# Patient Record
Sex: MALE | Race: BLACK OR AFRICAN AMERICAN | NOT HISPANIC OR LATINO | URBAN - METROPOLITAN AREA
[De-identification: names, ages, dates, MRNs, and addresses within clinical notes are randomized per-mention and may not be internally consistent; named-entity substitution may affect disease eponyms.]

---

## 2024-07-13 ENCOUNTER — HOSPITAL ENCOUNTER (EMERGENCY)
Facility: HOSPITAL | Age: 24
Discharge: HOME | End: 2024-07-13
Attending: STUDENT IN AN ORGANIZED HEALTH CARE EDUCATION/TRAINING PROGRAM

## 2024-07-13 VITALS
RESPIRATION RATE: 16 BRPM | DIASTOLIC BLOOD PRESSURE: 70 MMHG | TEMPERATURE: 99 F | HEIGHT: 69 IN | SYSTOLIC BLOOD PRESSURE: 133 MMHG | HEART RATE: 85 BPM | WEIGHT: 200 LBS | OXYGEN SATURATION: 98 % | BODY MASS INDEX: 29.62 KG/M2

## 2024-07-13 DIAGNOSIS — U07.1 COVID-19: Primary | ICD-10-CM

## 2024-07-13 LAB — SARS-COV-2 RDRP RESP QL NAA+PROBE: POSITIVE

## 2024-07-13 PROCEDURE — 87491 CHLMYD TRACH DNA AMP PROBE: CPT | Performed by: STUDENT IN AN ORGANIZED HEALTH CARE EDUCATION/TRAINING PROGRAM

## 2024-07-13 PROCEDURE — 87591 N.GONORRHOEAE DNA AMP PROB: CPT | Performed by: STUDENT IN AN ORGANIZED HEALTH CARE EDUCATION/TRAINING PROGRAM

## 2024-07-13 PROCEDURE — 99283 EMERGENCY DEPT VISIT LOW MDM: CPT

## 2024-07-13 PROCEDURE — U0002 COVID-19 LAB TEST NON-CDC: HCPCS | Performed by: PHYSICIAN ASSISTANT

## 2024-07-13 PROCEDURE — 25000003 PHARM REV CODE 250: Performed by: STUDENT IN AN ORGANIZED HEALTH CARE EDUCATION/TRAINING PROGRAM

## 2024-07-13 RX ORDER — IBUPROFEN 600 MG/1
600 TABLET ORAL
Status: COMPLETED | OUTPATIENT
Start: 2024-07-13 | End: 2024-07-13

## 2024-07-13 RX ADMIN — IBUPROFEN 600 MG: 600 TABLET, FILM COATED ORAL at 09:07

## 2024-07-13 NOTE — Clinical Note
"Benito"Ngiel Posey was seen and treated in our emergency department on 7/13/2024.  He may return to work on 07/17/2024.       If you have any questions or concerns, please don't hesitate to call.      Aris Calles MD"

## 2024-07-14 NOTE — ED NOTES
Urine sent off  Educated pt on pending results and recommendation to abstain from sexual activity until her receives a phone call or results over my chart.  Explained to pt on how to set up a mychart.  Explained to pt his choices of follow up is his results should be positive.  Educated pt on treatment of positive result, abstinence for minimal of 2 weeks after treatment, and notification of partner to receive treatment.    Pt verbalized understanding  All discharge instructions provided.  Pt discharged in stable condition.

## 2024-07-14 NOTE — ED PROVIDER NOTES
"ED Provider Note - 7/13/2024    History     Chief Complaint   Patient presents with    COVID-19 Concerns     Pt c/o congestion, chills, and cough. Denies any chest pain or SOB. Pt does not appear to be in any distress in triage.       HPI     Benito Posey is a 23 y.o. year old male with past medical and surgical history as seen below, presenting with chief complaint of congestion, chills, and cough.  No chest pain or shortness of breath.  Symptoms worsening over the past day.        No past medical history on file.  No past surgical history on file.      No family history on file.     Social Determinants of Health with Concerns     Tobacco Use: Not on file   Alcohol Use: Not on file   Financial Resource Strain: Not on file   Food Insecurity: Not on file   Transportation Needs: Not on file   Physical Activity: Not on file   Stress: Not on file   Housing Stability: Not on file   Depression: Not on file   Utilities: Not on file   Health Literacy: Not on file   Social Isolation: Not on file      Review of patient's allergies indicates:  No Known Allergies    Review of Systems     A full Review of Systems (ROS) was performed and was negative unless otherwise stated in the HPI.      Physical Exam     Vitals:    07/13/24 1936   BP: 133/70   Pulse: 85   Resp: 16   Temp: 98.6 °F (37 °C)   TempSrc: Oral   SpO2: 98%   Weight: 90.7 kg (200 lb)   Height: 5' 9" (1.753 m)        Physical Exam    Nursing note and vitals reviewed.  Constitutional: He appears well-developed and well-nourished. No distress.   HENT:   Head: Normocephalic and atraumatic.   Right Ear: External ear normal.   Left Ear: External ear normal.   Nose: Nose normal.   Mouth/Throat: Oropharynx is clear and moist.   Eyes: Conjunctivae and EOM are normal. Pupils are equal, round, and reactive to light.   Neck: Neck supple.   Normal range of motion.  Cardiovascular:  Normal rate and regular rhythm.           No murmur heard.  Pulmonary/Chest: Breath sounds normal. " No stridor. No respiratory distress. He has no wheezes. He has no rhonchi. He has no rales.   Abdominal: Abdomen is soft. Bowel sounds are normal. There is no abdominal tenderness.   Musculoskeletal:         General: No edema. Normal range of motion.      Cervical back: Normal range of motion and neck supple.     Neurological: He is alert and oriented to person, place, and time. He has normal strength. No cranial nerve deficit or sensory deficit.   Skin: Skin is warm and dry. No rash noted.   Psychiatric: He has a normal mood and affect. Thought content normal.         Lab Results- Independently reviewed by myself      Labs Reviewed   SARS-COV-2 RNA AMPLIFICATION, QUAL - Abnormal; Notable for the following components:       Result Value    SARS-CoV-2 RNA, Amplification, Qual Positive (*)     All other components within normal limits    Narrative:     COVID+ result(s) called and verbal readback obtained from Maria Eugenia Block RN by [facility] 07/13/2024 20:50   C. TRACHOMATIS/N. GONORRHOEAE BY AMP DNA           Imaging     Imaging Results    None                    ED Course         Procedures         Orders Placed This Encounter    COVID-19 Rapid Screening    C. trachomatis/N. gonorrhoeae by AMP DNA [facility]; Urine    ibuprofen tablet 600 mg                      Medical Decision Making       The patient's list of active medical problems, social history, medications, and allergies as documented per RN staff has been reviewed.           Medical Decision Making  This is a 23 y.o. male who appears to have a viral syndrome related to Covid-19.      Differential discussion:    Based upon the history and physical exam the patient does not appear to have a serious bacterial infection such as pneumonia, sepsis, otitis media, bacterial sinusitis, strep pharyngitis, parapharyngeal or peritonsillar abscess, or meningitis.      Patient appears very well and I have given specific return precautions to the patient and/or family  members. Symptomatic/supportive measures discussed. The patient can take over the counter medications and does not appear to need antibiotics at this time.      Amount and/or Complexity of Data Reviewed  Labs: ordered.     Details: COVID positive    Risk  Prescription drug management.                    ED Prescriptions    None           Clinical Impression       Follow-up Information       Follow up With Specialties Details Why Contact Info    Primary care provider of your choice  Schedule an appointment as soon as possible for a visit in 3 days For follow-up on today's visit.     [facility] Emergency Medicine Go to  As needed, If symptoms worsen [address] [facility] [id number] [phone]            Referrals:  No orders of the defined types were placed in this encounter.      Disposition   ED Disposition Condition    Discharge Stable            Diagnosis    ICD-10-CM ICD-9-CM   1. COVID-19  U07.1 079.89           Aris Calles MD        07/14/2024          DISCLAIMER: This note was prepared with [employer] voice recognition transcription software. Garbled syntax, mangled pronouns, and other bizarre constructions may be attributed to that software system.       Aris Calles MD  07/14/24 0553

## 2024-07-16 LAB
C TRACH DNA SPEC QL NAA+PROBE: NOT DETECTED
N GONORRHOEA DNA SPEC QL NAA+PROBE: NOT DETECTED